# Patient Record
Sex: FEMALE | NOT HISPANIC OR LATINO | URBAN - METROPOLITAN AREA
[De-identification: names, ages, dates, MRNs, and addresses within clinical notes are randomized per-mention and may not be internally consistent; named-entity substitution may affect disease eponyms.]

---

## 2023-12-16 ENCOUNTER — HOSPITAL ENCOUNTER (EMERGENCY)
Facility: HOSPITAL | Age: 23
Discharge: HOME | End: 2023-12-16
Attending: EMERGENCY MEDICINE
Payer: COMMERCIAL

## 2023-12-16 VITALS
BODY MASS INDEX: 21.61 KG/M2 | WEIGHT: 134.48 LBS | HEART RATE: 92 BPM | RESPIRATION RATE: 16 BRPM | SYSTOLIC BLOOD PRESSURE: 118 MMHG | DIASTOLIC BLOOD PRESSURE: 80 MMHG | TEMPERATURE: 98.1 F | HEIGHT: 66 IN | OXYGEN SATURATION: 96 %

## 2023-12-16 DIAGNOSIS — S00.81XA ABRASION OF FACE, INITIAL ENCOUNTER: ICD-10-CM

## 2023-12-16 DIAGNOSIS — S09.90XA HEAD INJURY, INITIAL ENCOUNTER: Primary | ICD-10-CM

## 2023-12-16 DIAGNOSIS — S01.01XA LACERATION OF SCALP, INITIAL ENCOUNTER: ICD-10-CM

## 2023-12-16 PROCEDURE — 2500000001 HC RX 250 WO HCPCS SELF ADMINISTERED DRUGS (ALT 637 FOR MEDICARE OP): Performed by: EMERGENCY MEDICINE

## 2023-12-16 PROCEDURE — 12001 RPR S/N/AX/GEN/TRNK 2.5CM/<: CPT | Performed by: EMERGENCY MEDICINE

## 2023-12-16 PROCEDURE — 2500000005 HC RX 250 GENERAL PHARMACY W/O HCPCS: Performed by: EMERGENCY MEDICINE

## 2023-12-16 PROCEDURE — 99283 EMERGENCY DEPT VISIT LOW MDM: CPT | Mod: 25 | Performed by: EMERGENCY MEDICINE

## 2023-12-16 RX ORDER — LIDOCAINE HCL/EPINEPHRINE/PF 2%-1:200K
1.7 VIAL (ML) INJECTION ONCE
Status: COMPLETED | OUTPATIENT
Start: 2023-12-16 | End: 2023-12-16

## 2023-12-16 RX ADMIN — BACITRACIN ZINC AND POLYMYXIN B SULFATE: at 03:15

## 2023-12-16 RX ADMIN — LIDOCAINE HYDROCHLORIDE,EPINEPHRINE BITARTRATE 1.7 ML: 20; .005 INJECTION, SOLUTION EPIDURAL; INFILTRATION; INTRACAUDAL; PERINEURAL at 03:15

## 2023-12-16 ASSESSMENT — COLUMBIA-SUICIDE SEVERITY RATING SCALE - C-SSRS
6. HAVE YOU EVER DONE ANYTHING, STARTED TO DO ANYTHING, OR PREPARED TO DO ANYTHING TO END YOUR LIFE?: NO
1. IN THE PAST MONTH, HAVE YOU WISHED YOU WERE DEAD OR WISHED YOU COULD GO TO SLEEP AND NOT WAKE UP?: NO
2. HAVE YOU ACTUALLY HAD ANY THOUGHTS OF KILLING YOURSELF?: NO

## 2023-12-16 ASSESSMENT — PAIN DESCRIPTION - PROGRESSION: CLINICAL_PROGRESSION: NOT CHANGED

## 2023-12-16 ASSESSMENT — PAIN - FUNCTIONAL ASSESSMENT: PAIN_FUNCTIONAL_ASSESSMENT: 0-10

## 2023-12-16 ASSESSMENT — PAIN SCALES - GENERAL: PAINLEVEL_OUTOF10: 3

## 2023-12-16 NOTE — DISCHARGE INSTRUCTIONS
You can take 2 Motrin and or 2 extra strength Tylenol for 6 hours for pain.  You can use ice for pain and swelling.  You should expect some swelling around your left eye when you wake up in the morning.  It should go away as the day progresses.    Every day clean the abrasions and cuts with soap and water and apply the antibiotic ointment.    You need the sutures removed in 7 days.  Turn earlier if you have signs of infection, surrounding redness, discharge or the wound opens up.    You have a head injury and possible concussion.  Avoid any activities where you could hit your head again.  Return to the ER if you have nausea, vomiting, change in your vision, change in your speech, weakness in your arms or legs.

## 2023-12-16 NOTE — ED PROCEDURE NOTE
Procedure  Laceration Repair    Performed by: Jeff Jo MD  Authorized by: Jeff Jo MD    Consent:     Consent obtained:  Verbal    Consent given by:  Patient    Risks discussed:  Infection, need for additional repair, poor cosmetic result, pain, nerve damage, vascular damage and poor wound healing    Alternatives discussed:  No treatment  Universal protocol:     Procedure explained and questions answered to patient or proxy's satisfaction: yes      Patient identity confirmed:  Verbally with patient, arm band and hospital-assigned identification number  Anesthesia:     Anesthesia method:  Local infiltration    Local anesthetic:  Lidocaine 2% WITH epi  Laceration details:     Location:  Scalp    Scalp location:  Frontal    Length (cm):  1    Depth (mm):  2  Pre-procedure details:     Preparation:  Patient was prepped and draped in usual sterile fashion  Exploration:     Hemostasis achieved with:  Epinephrine    Imaging outcome: foreign body not noted      Wound exploration: wound explored through full range of motion and entire depth of wound visualized      Contaminated: no    Treatment:     Area cleansed with:  Povidone-iodine and soap and water    Amount of cleaning:  Standard    Irrigation solution:  Sterile water  Skin repair:     Repair method:  Sutures    Suture size:  5-0    Suture material:  Nylon    Suture technique:  Simple interrupted    Number of sutures:  4  Approximation:     Approximation:  Close  Repair type:     Repair type:  Simple  Post-procedure details:     Dressing:  Antibiotic ointment and non-adherent dressing    Procedure completion:  Tolerated               Jeff Jo MD  12/16/23 0416

## 2023-12-16 NOTE — ED PROVIDER NOTES
"HPI   Chief Complaint   Patient presents with    Head Injury     Pt states she fell down three steps and landed on her head on concrete.  Pt denies any loc.  Pupils equal and reactive.  Pt has a deep lac approx 1/4\" on the left side of her forehead.       23-year-old female presents the ER with head injury after a fall.  Witnessed trip and fall on stairs where she slid down the stairs and hit her head after falling down 3 stairs.  No LOC.  She has abrasion laceration on the left side of the forehead.      Denies headache.  Denies facial pain.  Denies neck pain.  Denies loss of consciousness.  No chest pain or shortness of breath.  No nausea or vomiting.  No pain in the upper or lower extremities.    Injury witnessed by friends..  No loss of consciousness.    Tetanus vaccine up-to-date with recent tetanus shot this year      History provided by:  Friend and patient                      [place] Coma Scale Score: 15                  Patient History   Past Medical History:   Diagnosis Date    Iliotibial band syndrome, unspecified leg     IT band syndrome    Myopia, bilateral 08/04/2020    Myopia of both eyes with astigmatism    Other enthesopathies, not elsewhere classified     Triceps tendonitis    Other specified health status 09/12/2016    No pertinent past surgical history    Pain in left elbow     Left elbow pain    Pain in left hip     Left hip pain     Past Surgical History:   Procedure Laterality Date    OTHER SURGICAL HISTORY  03/26/2021    Rock tooth extraction     No family history on file.  Social History     Tobacco Use    Smoking status: Unknown    Smokeless tobacco: Not on file   Substance Use Topics    Alcohol use: Not on file    Drug use: Not on file       Physical Exam   ED Triage Vitals [12/16/23 0257]   Temp Heart Rate Resp BP   36.7 °C (98.1 °F) 92 16 118/80      SpO2 Temp Source Heart Rate Source Patient Position   96 % Oral Monitor Sitting      BP Location FiO2 (%)     Right arm --   "     Physical Exam  Vitals and nursing note reviewed.     Constitutional: Well appearing and hydrated. Awake & alert. No acute distress.  Head: No raccoon eyes or Vences sign.  Patient has a laceration on the left side of the forehead above the eyebrow with abrasions along the left forehead and left side of the face.  No scalp hematoma or tenderness.  Eyes: Sclerae are anicteric and not injected.  ENT: Airway is patent.  No malocclusion.  No nasal bone tenderness.  No maxillary tenderness.    Neck: Neck is supple and non-tender. No stridor.  No C-spine tenderness.  No pain with range of motion  Cardiovascular: Regular rate.  No chest wall tenderness  Pulmonary/Chest: Clear to auscultation bilaterally. No distress.  GI: Soft and non-distended. There is no discomfort with palpation.  Back: No T or L-spine tenderness  Extremities: Full range of motion in all extremities and no deformity.  No pain with range of motion upper or lower extremities.  Ambulating without any pain.  Neurological: Alert, awake.  Moving all extremities.  NIH stroke scale 0  Skin: Skin is warm and dry.  1 cm laceration through the scalp to the galea on the left side of the forehead.  Abrasion to the left side of forehead and left side of the face.  Abrasion superficial  Psychiatric: Cooperative.    ED Course & MDM   Diagnoses as of 12/16/23 0443   Head injury, initial encounter   Laceration of scalp, initial encounter   Abrasion of face, initial encounter       Medical Decision Making  1.  Head injury: Patient slip and fall with a head injury to the forehead on 3 stairs.  No LOC.  Mild headache.  No anticoagulation.  No confusion.  No change in vision.  No change in speech.  No weakness in the arms or legs.  NIH stroke scale of 0.  During the HPI and suture repair patient was alert and awake answering questions appropriately.  Patient is low risk according to [facility] rules for intracranial bleed.    Had discussed the risks and benefits of CAT  scan with the patient.  Patient patient refused CAT scan.    Discussed concussion precautions with patient and friends.  Patient will not do any activities where she could hit her head again at least 1 week after resolution of all symptoms including headache nausea and fatigue.    She will return to the ER if she develops worsening headache, change in vision, change in speech, confusion or weakness in the arms or legs    #2 scalp laceration: Scalp laceration closed by this attending.  See separate note.  Bacitracin and dressing applied.  Discussed wound care with patient.  She will clean the wound daily with soap and water and apply antibiotic ointment which she she was given in the ER.  She will return to the ER in 7 days for suture removal.  And, earlier if there are any signs of infection including surrounding redness, discharge or wound separation.    Discussed clinical pression, treatment in the ER and treatment home.  Patient agreed with plan.  Patient's friends were present during discussion.    Charge home and stable.  Clinically sober at discharge        Procedure  Procedures     Jeff Jo MD  12/16/23 3245

## 2024-11-19 ENCOUNTER — TELEPHONE (OUTPATIENT)
Dept: OBSTETRICS AND GYNECOLOGY | Facility: CLINIC | Age: 24
End: 2024-11-19
Payer: COMMERCIAL